# Patient Record
Sex: MALE | URBAN - METROPOLITAN AREA
[De-identification: names, ages, dates, MRNs, and addresses within clinical notes are randomized per-mention and may not be internally consistent; named-entity substitution may affect disease eponyms.]

---
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
## 2020-01-06 ENCOUNTER — APPOINTMENT (OUTPATIENT)
Dept: URBAN - METROPOLITAN AREA CLINIC 220 | Age: 20
Setting detail: DERMATOLOGY
End: 2020-01-06

## 2020-01-06 DIAGNOSIS — Z41.9 ENCOUNTER FOR PROCEDURE FOR PURPOSES OTHER THAN REMEDYING HEALTH STATE, UNSPECIFIED: ICD-10-CM

## 2020-01-06 PROCEDURE — OTHER OTHER (COSMETIC): OTHER

## 2020-01-06 PROCEDURE — OTHER MIPS QUALITY: OTHER

## 2020-01-06 NOTE — PROCEDURE: MIPS QUALITY
Quality 131: Pain Assessment And Follow-Up: Pain assessment using a standardized tool is documented as negative, no follow-up plan required
Quality 130: Documentation Of Current Medications In The Medical Record: Current Medications Documented
Detail Level: Detailed
Quality 431: Preventive Care And Screening: Unhealthy Alcohol Use - Screening: Patient screened for unhealthy alcohol use using a single question and scores less than 2 times per year
Quality 226: Preventive Care And Screening: Tobacco Use: Screening And Cessation Intervention: Patient screened for tobacco use and is an ex/non-smoker
Quality 110: Preventive Care And Screening: Influenza Immunization: Influenza immunization was not ordered or administered, reason not given

## 2020-01-06 NOTE — PROCEDURE: OTHER (COSMETIC)
Detail Level: Zone
Other (Free Text): Cosmetic Consultation\\nPE:\\n\\nPLAN:\\n\\n1.\\n\\n2.\\n\\n3.\\n\\nNOTE:\\n\\Saima indications, treatment expectations (including management of any possible irritations), protocols, risks and benefits, pre/post care are reviewed.  Patient understands that multiple treatments may be necessary for optimum results and that on going maintenance with at-home products and additional office visits or treatments may be needed to enhance and extend the desired results.\\n\\Saima questions were addressed.\\n\\nRTC-

## 2020-01-06 NOTE — HPI: OTHER
Condition:: Cosmetic
Please Describe Your Condition:: Pt present today for a follow up after a series of 5 microneedling for acne scars on cheeks. The pt skin has improved by 80% there is still mild atrophic scaring 95% of discoloration has diminished. Pt  denies any changes in med hx and allergies. Pain 0/10

## 2023-11-28 NOTE — HPI: OTHER
Condition:: Cosmetic
Please Describe Your Condition:: Pt present today for micorneedling 1/5 for cheeks. Pt states skin regimen is cetaphil. Pt med hx and allergies have been reviewed. Pain 0/10
,DirectAddress_Unknown,DirectAddress_Unknown

## 2025-04-30 NOTE — PROCEDURE: MIPS QUALITY
Needs appointment ASAP for labs, blood pressure and diabetes    Antihyperglycemics Protocol [id number] 09:15 AM   Protocol Details Last creatinine level resulted within the past 12 months    Last HgbA1c resulted within the past 6 months    Visit with authorizing provider in past 6 months or upcoming 90 days    Last GFR = or > 30 in the past 12 months        
Quality 130: Documentation Of Current Medications In The Medical Record: Current Medications Documented
Quality 226: Preventive Care And Screening: Tobacco Use: Screening And Cessation Intervention: Patient screened for tobacco use and is an ex/non-smoker
Quality 110: Preventive Care And Screening: Influenza Immunization: Influenza Immunization Administered during Influenza season
Quality 131: Pain Assessment And Follow-Up: Pain assessment using a standardized tool is documented as negative, no follow-up plan required
Detail Level: Detailed
Quality 431: Preventive Care And Screening: Unhealthy Alcohol Use - Screening: Patient screened for unhealthy alcohol use using a single question and scores less than 2 times per year